# Patient Record
Sex: FEMALE | Race: OTHER | HISPANIC OR LATINO | URBAN - METROPOLITAN AREA
[De-identification: names, ages, dates, MRNs, and addresses within clinical notes are randomized per-mention and may not be internally consistent; named-entity substitution may affect disease eponyms.]

---

## 2018-07-11 ENCOUNTER — EMERGENCY (EMERGENCY)
Facility: HOSPITAL | Age: 43
LOS: 1 days | Discharge: HOME | End: 2018-07-11
Admitting: EMERGENCY MEDICINE
Payer: MEDICAID

## 2018-07-11 VITALS
TEMPERATURE: 99 F | SYSTOLIC BLOOD PRESSURE: 135 MMHG | HEART RATE: 73 BPM | DIASTOLIC BLOOD PRESSURE: 83 MMHG | RESPIRATION RATE: 18 BRPM | OXYGEN SATURATION: 100 %

## 2018-07-11 PROCEDURE — 99283 EMERGENCY DEPT VISIT LOW MDM: CPT

## 2018-07-11 RX ORDER — IBUPROFEN 200 MG
400 TABLET ORAL ONCE
Refills: 0 | Status: COMPLETED | OUTPATIENT
Start: 2018-07-11 | End: 2018-07-11

## 2018-07-11 RX ADMIN — Medication 400 MILLIGRAM(S): at 23:54

## 2018-07-11 NOTE — ED PROVIDER NOTE - OBJECTIVE STATEMENT
43 y/o female, no pmh, c/o foreign body sensation since waking up this AM. She states here eye was tearing and felt like something was in it but does not recal any trauma or foreign body. She does not wear contact or corrective lenses. Denies change in vision, photophobia. She was unable to see her eye doctor today.

## 2018-07-11 NOTE — ED ADULT TRIAGE NOTE - CHIEF COMPLAINT QUOTE
Patient c/o "feeling like something in my eye". Patient states her eyes are tearing a lot. Patient denies any changes in vision. Patient denies any PMHx.

## 2018-07-11 NOTE — ED PROVIDER NOTE - MEDICAL DECISION MAKING DETAILS
exam showed no fb or abrasion, no photophobia or d/c. pain relieved by tetracaine . lids fliped, no fb, eye irrigated. pt felt better. will d/c home with ophthalmology follow up

## 2018-07-11 NOTE — ED PROVIDER NOTE - LEFT EYE
mild tearing left eye, mild perilimbic injection otherwise conjunctiva clear. PERRL, slit lamp = no FB, No corneal abrasion. pain relieved with tetracaine

## 2018-09-12 ENCOUNTER — EMERGENCY (EMERGENCY)
Facility: HOSPITAL | Age: 43
LOS: 1 days | Discharge: HOME | End: 2018-09-12
Admitting: EMERGENCY MEDICINE
Payer: MEDICAID

## 2018-09-12 VITALS
SYSTOLIC BLOOD PRESSURE: 112 MMHG | TEMPERATURE: 98 F | OXYGEN SATURATION: 100 % | DIASTOLIC BLOOD PRESSURE: 75 MMHG | RESPIRATION RATE: 18 BRPM | HEART RATE: 64 BPM

## 2018-09-12 PROBLEM — Z00.00 ENCOUNTER FOR PREVENTIVE HEALTH EXAMINATION: Status: ACTIVE | Noted: 2018-09-12

## 2018-09-12 LAB
ALBUMIN SERPL ELPH-MCNC: 4.6 G/DL — SIGNIFICANT CHANGE UP (ref 3.3–5)
ALP SERPL-CCNC: 29 U/L — LOW (ref 40–120)
ALT FLD-CCNC: 15 U/L — SIGNIFICANT CHANGE UP (ref 4–33)
APPEARANCE UR: CLEAR — SIGNIFICANT CHANGE UP
AST SERPL-CCNC: 44 U/L — HIGH (ref 4–32)
BASOPHILS # BLD AUTO: 0.04 K/UL — SIGNIFICANT CHANGE UP (ref 0–0.2)
BASOPHILS NFR BLD AUTO: 0.5 % — SIGNIFICANT CHANGE UP (ref 0–2)
BILIRUB SERPL-MCNC: 0.2 MG/DL — SIGNIFICANT CHANGE UP (ref 0.2–1.2)
BILIRUB UR-MCNC: NEGATIVE — SIGNIFICANT CHANGE UP
BLOOD UR QL VISUAL: NEGATIVE — SIGNIFICANT CHANGE UP
BUN SERPL-MCNC: 11 MG/DL — SIGNIFICANT CHANGE UP (ref 7–23)
CALCIUM SERPL-MCNC: 9.5 MG/DL — SIGNIFICANT CHANGE UP (ref 8.4–10.5)
CHLORIDE SERPL-SCNC: 100 MMOL/L — SIGNIFICANT CHANGE UP (ref 98–107)
CO2 SERPL-SCNC: 25 MMOL/L — SIGNIFICANT CHANGE UP (ref 22–31)
COLOR SPEC: SIGNIFICANT CHANGE UP
CREAT SERPL-MCNC: 0.58 MG/DL — SIGNIFICANT CHANGE UP (ref 0.5–1.3)
EOSINOPHIL # BLD AUTO: 0.14 K/UL — SIGNIFICANT CHANGE UP (ref 0–0.5)
EOSINOPHIL NFR BLD AUTO: 1.8 % — SIGNIFICANT CHANGE UP (ref 0–6)
GLUCOSE SERPL-MCNC: 86 MG/DL — SIGNIFICANT CHANGE UP (ref 70–99)
GLUCOSE UR-MCNC: NEGATIVE — SIGNIFICANT CHANGE UP
HCT VFR BLD CALC: 34.8 % — SIGNIFICANT CHANGE UP (ref 34.5–45)
HGB BLD-MCNC: 11.7 G/DL — SIGNIFICANT CHANGE UP (ref 11.5–15.5)
IMM GRANULOCYTES # BLD AUTO: 0.02 # — SIGNIFICANT CHANGE UP
IMM GRANULOCYTES NFR BLD AUTO: 0.3 % — SIGNIFICANT CHANGE UP (ref 0–1.5)
KETONES UR-MCNC: NEGATIVE — SIGNIFICANT CHANGE UP
LEUKOCYTE ESTERASE UR-ACNC: NEGATIVE — SIGNIFICANT CHANGE UP
LYMPHOCYTES # BLD AUTO: 2.57 K/UL — SIGNIFICANT CHANGE UP (ref 1–3.3)
LYMPHOCYTES # BLD AUTO: 32.4 % — SIGNIFICANT CHANGE UP (ref 13–44)
MCHC RBC-ENTMCNC: 29.1 PG — SIGNIFICANT CHANGE UP (ref 27–34)
MCHC RBC-ENTMCNC: 33.6 % — SIGNIFICANT CHANGE UP (ref 32–36)
MCV RBC AUTO: 86.6 FL — SIGNIFICANT CHANGE UP (ref 80–100)
MONOCYTES # BLD AUTO: 0.53 K/UL — SIGNIFICANT CHANGE UP (ref 0–0.9)
MONOCYTES NFR BLD AUTO: 6.7 % — SIGNIFICANT CHANGE UP (ref 2–14)
NEUTROPHILS # BLD AUTO: 4.64 K/UL — SIGNIFICANT CHANGE UP (ref 1.8–7.4)
NEUTROPHILS NFR BLD AUTO: 58.3 % — SIGNIFICANT CHANGE UP (ref 43–77)
NITRITE UR-MCNC: NEGATIVE — SIGNIFICANT CHANGE UP
NRBC # FLD: 0 — SIGNIFICANT CHANGE UP
PH UR: 5.5 — SIGNIFICANT CHANGE UP (ref 5–8)
PLATELET # BLD AUTO: 403 K/UL — HIGH (ref 150–400)
PMV BLD: 10.3 FL — SIGNIFICANT CHANGE UP (ref 7–13)
POTASSIUM SERPL-MCNC: 5.3 MMOL/L — SIGNIFICANT CHANGE UP (ref 3.5–5.3)
POTASSIUM SERPL-SCNC: 5.3 MMOL/L — SIGNIFICANT CHANGE UP (ref 3.5–5.3)
PROT SERPL-MCNC: 7.8 G/DL — SIGNIFICANT CHANGE UP (ref 6–8.3)
PROT UR-MCNC: NEGATIVE — SIGNIFICANT CHANGE UP
RBC # BLD: 4.02 M/UL — SIGNIFICANT CHANGE UP (ref 3.8–5.2)
RBC # FLD: 13.3 % — SIGNIFICANT CHANGE UP (ref 10.3–14.5)
SODIUM SERPL-SCNC: 136 MMOL/L — SIGNIFICANT CHANGE UP (ref 135–145)
SP GR SPEC: 1.02 — SIGNIFICANT CHANGE UP (ref 1–1.04)
UROBILINOGEN FLD QL: NORMAL — SIGNIFICANT CHANGE UP
WBC # BLD: 7.94 K/UL — SIGNIFICANT CHANGE UP (ref 3.8–10.5)
WBC # FLD AUTO: 7.94 K/UL — SIGNIFICANT CHANGE UP (ref 3.8–10.5)

## 2018-09-12 PROCEDURE — 74176 CT ABD & PELVIS W/O CONTRAST: CPT | Mod: 26

## 2018-09-12 PROCEDURE — 99284 EMERGENCY DEPT VISIT MOD MDM: CPT

## 2018-09-12 RX ORDER — LIDOCAINE 4 G/100G
1 CREAM TOPICAL ONCE
Qty: 0 | Refills: 0 | Status: COMPLETED | OUTPATIENT
Start: 2018-09-12 | End: 2018-09-12

## 2018-09-12 RX ORDER — DIAZEPAM 5 MG
5 TABLET ORAL ONCE
Qty: 0 | Refills: 0 | Status: DISCONTINUED | OUTPATIENT
Start: 2018-09-12 | End: 2018-09-12

## 2018-09-12 RX ORDER — IBUPROFEN 200 MG
600 TABLET ORAL ONCE
Qty: 0 | Refills: 0 | Status: COMPLETED | OUTPATIENT
Start: 2018-09-12 | End: 2018-09-12

## 2018-09-12 RX ADMIN — Medication 5 MILLIGRAM(S): at 21:20

## 2018-09-12 RX ADMIN — Medication 600 MILLIGRAM(S): at 21:20

## 2018-09-12 RX ADMIN — LIDOCAINE 1 PATCH: 4 CREAM TOPICAL at 21:19

## 2018-09-12 RX ADMIN — Medication 600 MILLIGRAM(S): at 22:00

## 2018-09-12 NOTE — ED PROVIDER NOTE - OBJECTIVE STATEMENT
41 y/o female no pmh presents with c/o right lower back pain worse with movement and bending down x 3 days, denies any trauma, heavy lifting, headaches. neck pain, cough, f/c/n/v/d,c hest pain, sob, abdominal pain, groin pain, hematuria/polyuria, saddle anestehsia, loss of bowel/bladder, numbness/weakness/tingling, recent travel, sick contact, social history, has tried taking motrin with minimal relief

## 2018-09-12 NOTE — ED PROVIDER NOTE - PROGRESS NOTE DETAILS
JANICE SOLANO: ruq sono resulted, shows no acute roderick, pt states that shes feeling a lot better, + toelrating PO at this time, will have her f/u with surgery clini, and [facility]

## 2018-09-12 NOTE — ED PROVIDER NOTE - CARE PLAN
Principal Discharge DX:	Abdominal pain  Assessment and plan of treatment:	pls rest, drink plenty of fluids, tylenol/motrin as needed for pain, f/u in surgery clinic [phone], return for any worsening symptoms or any other concerning symptoms

## 2018-09-12 NOTE — ED ADULT TRIAGE NOTE - CHIEF COMPLAINT QUOTE
pt c/o right lower back and flank pain since sunday, denies injury or trauma. denies numbness/tingling down legs. denies urinary symptoms, n/v/d/fevers/chills or any additional symptoms. denies medical history.

## 2018-09-12 NOTE — ED PROVIDER NOTE - PLAN OF CARE
pls rest, drink plenty of fluids, tylenol/motrin as needed for pain, f/u in surgery clinic [phone], return for any worsening symptoms or any other concerning symptoms

## 2018-09-12 NOTE — ED PROVIDER NOTE - MEDICAL DECISION MAKING DETAILS
right lower back pain, + paraspinal tenderness, no midline tenderness + straigght leg, meds, reasses

## 2018-09-13 VITALS
SYSTOLIC BLOOD PRESSURE: 114 MMHG | OXYGEN SATURATION: 100 % | DIASTOLIC BLOOD PRESSURE: 60 MMHG | RESPIRATION RATE: 18 BRPM | TEMPERATURE: 98 F | HEART RATE: 66 BPM

## 2018-09-13 PROCEDURE — 76705 ECHO EXAM OF ABDOMEN: CPT | Mod: 26

## 2018-09-13 RX ORDER — LIDOCAINE 4 G/100G
1 CREAM TOPICAL
Qty: 7 | Refills: 0 | OUTPATIENT
Start: 2018-09-13 | End: 2018-09-19

## 2018-09-13 RX ORDER — LIDOCAINE 4 G/100G
1 CREAM TOPICAL
Qty: 5 | Refills: 0 | OUTPATIENT
Start: 2018-09-13 | End: 2018-09-15

## 2018-09-13 RX ORDER — CYCLOBENZAPRINE HYDROCHLORIDE 10 MG/1
10 TABLET, FILM COATED ORAL ONCE
Qty: 0 | Refills: 0 | Status: COMPLETED | OUTPATIENT
Start: 2018-09-13 | End: 2018-09-13

## 2018-09-13 RX ORDER — CYCLOBENZAPRINE HYDROCHLORIDE 10 MG/1
1 TABLET, FILM COATED ORAL
Qty: 15 | Refills: 0 | OUTPATIENT
Start: 2018-09-13 | End: 2018-09-17

## 2018-09-13 RX ADMIN — CYCLOBENZAPRINE HYDROCHLORIDE 10 MILLIGRAM(S): 10 TABLET, FILM COATED ORAL at 05:07

## 2018-09-13 NOTE — CONSULT NOTE ADULT - SUBJECTIVE AND OBJECTIVE BOX
SURGERY CONSULT NOTE  --------------------------------------------------------------------------------------------     HPI: 42 year old woman with PMH of kidney stones, , and umbilical hernia presents with right flank pain since . She states the pain has been worsening and now involves the RLQ. She has never had pain like this before. She states it feels like she pulled a muscle in her right lower back. The pain is less severe than the pain she experienced with kidney stones. She denies nausea, vomiting, fevers, chills, or dysuria.       PAST MEDICAL & SURGICAL HISTORY:  Kidney stones      ALLERGIES: latex (Unknown)  No Known Drug Allergies      --------------------------------------------------------------------------------------------    Vitals:   T(C): 36.7 (18 @ 23:54), Max: 36.7 (18 @ 23:54)  HR: 60 (18 @ 23:54) (60 - 64)  BP: 118/60 (18 @ 23:54) (112/75 - 118/60)  RR: 18 (18 @ 23:54) (18 - 18)  SpO2: 100% (18 @ 23:54) (100% - 100%)      PHYSICAL EXAM:  General: Alert, NAD  HEENT: NC/AT, no asymmetry, no scleral icterus  Cardio: RRR  Resp: Airway patent, unlabored breathing  GI/Abd: Soft, mild tenderness in RLQ, moderate RUQ and right flank and CVA tenderness, no guarding or rebound, no masses  Ext: Warm, no edema  --------------------------------------------------------------------------------------------    LABS  CBC ( 20:04)                              11.7                           7.94    )----------------(  403<H>     58.3  % Neutrophils, 32.4  % Lymphocytes, ANC: 4.64                                34.8      BMP ( 20:04)             136     |  100     |  11    		Ca++ --      Ca 9.5                ---------------------------------( 86    		Mg --                 5.3     |  25      |  0.58  			Ph --        LFTs ( 20:04)      TPro 7.8 / Alb 4.6 / TBili 0.2 / DBili -- / AST 44<H> / ALT 15 / AlkPhos 29<L>    --------------------------------------------------------------------------------------------    MICROBIOLOGY  Urinalysis ( @ 20:30):     Color: LIGHT YELLOW / Appearance: CLEAR / S.016 / pH: 5.5 / Gluc: NEGATIVE / Ketones: NEGATIVE / Bili: NEGATIVE / Urobili: NORMAL / Protein :NEGATIVE / Nitrites: NEGATIVE / Leuk.Est: NEGATIVE / RBC:  / WBC:  / Sq Epi:  / Non Sq Epi:  / Bacteria        --------------------------------------------------------------------------------------------    IMAGING    CT Abdomen and Pelvis No Cont (18 @ 23:35)  IMPRESSION:     Mild dilatation of right renal pelvis without radiopaque stone visualized   along the course of the ureter. Correlate with urinalysis as this may   represent recent interval passage of right kidney stone or urinary tract   infection.No intrarenal calculi. No left-sided hydronephrosis.     Subcentimeter hypodense lesions within inferior pole of bilateral   kidneys, too small to characterize. Consider nonemergent evaluation with   renal ultrasound for better characterization.     3 mm appendicolith within the appendix without adjacent fat infiltration   or edema. No secondary evidence of acute appendicitis.

## 2018-09-13 NOTE — ED POST DISCHARGE NOTE - RESULT SUMMARY
Pt. contacted ED; Rx for lidocaine gel (with quantity written as patch formulation) sent to pharmacy; pt requesting patch, not gel.  Pharmacy info verified with pt; Rx for lidocaine topical patches sent to pharmacy.  Pt. advised that if lidocaine 5% patches not covered by her insurance, that a 4% lidocaine patch is available OTC.  Pt. verbalized understanding.

## 2018-09-13 NOTE — CONSULT NOTE ADULT - ATTENDING COMMENTS
I have personally interviewed and examined this patient, reviewed pertinent labs and imaging, and discussed the case with colleagues, residents, and physician assistants on B Team rounds.       Plan  RUQ sono with polyps (<5mm), no secondary signs of cholecystitis or cholelithiasisis, pt should follow up for repeat usg in 1 year to ensure no growth  trial of po  if planning to discharge, pt should be instructed to return if worsening pain, fever, po intolerance or other concerning symptoms  The Acute Care Surgery (B Team) Attending Group Practice:  Dr. Jian Araujo, Dr. Kareen Ness, Dr. David Romano, Dr. Irma Hardin, Dr. John Barth    urgent issues - spectra [phone] or [phone]  nonurgent issues - [phone]  patient appointments or afterhours - [phone]

## 2018-09-13 NOTE — CONSULT NOTE ADULT - ASSESSMENT
42 year old woman with PMH of kidney stones, , and umbilical hernia presents with right flank and RLQ pain for 3 days. CT shows mild dilatation of the right renal pelvis and 3 mm appendicolith. No secondary signs of acute appendicitis.    - History, exam, and imaging do not point to a clear diagnosis at this time. Flank pain, dilated renal pelvis, and history of kidney stones suggest passed kidney stone. Patient also without nausea, vomiting, or fevers but with RLQ pain and appendicolith which raises question of appendicitis, though this is less likely.  - Given RUQ tenderness on exam, would check US to evaluate gallbladder.  - Please page B team surgery [phone] with any questions.  - Seen and examined with Dr. Hardin.    SARA Capps PGY 2 42 year old woman with PMH of kidney stones, , and umbilical hernia presents with right flank and RLQ pain for 3 days. CT shows mild dilatation of the right renal pelvis and 3 mm appendicolith. No secondary signs of acute appendicitis.    - History, exam, and imaging do not point to a clear diagnosis at this time. Flank pain, dilated renal pelvis, and history of kidney stones suggest passed kidney stone. Patient also without nausea, vomiting, or fevers  - Pt with some RUQ tenderness but gallbladder US shows no cholelithiasis or cholecystitis  - Recommend PO challenge  - Please page B team surgery [phone] with any questions  - Seen and examined with Dr. Wilfred Capps PGY 2

## 2018-09-17 ENCOUNTER — EMERGENCY (EMERGENCY)
Facility: HOSPITAL | Age: 43
LOS: 1 days | Discharge: HOME | End: 2018-09-17
Attending: EMERGENCY MEDICINE | Admitting: EMERGENCY MEDICINE
Payer: MEDICAID

## 2018-09-17 VITALS
SYSTOLIC BLOOD PRESSURE: 119 MMHG | OXYGEN SATURATION: 100 % | HEART RATE: 75 BPM | TEMPERATURE: 98 F | RESPIRATION RATE: 16 BRPM | DIASTOLIC BLOOD PRESSURE: 73 MMHG

## 2018-09-17 VITALS
HEART RATE: 70 BPM | RESPIRATION RATE: 18 BRPM | TEMPERATURE: 98 F | SYSTOLIC BLOOD PRESSURE: 94 MMHG | OXYGEN SATURATION: 100 % | DIASTOLIC BLOOD PRESSURE: 52 MMHG

## 2018-09-17 PROCEDURE — 99284 EMERGENCY DEPT VISIT MOD MDM: CPT

## 2018-09-17 PROCEDURE — 72131 CT LUMBAR SPINE W/O DYE: CPT | Mod: 26

## 2018-09-17 RX ORDER — IBUPROFEN 200 MG
600 TABLET ORAL ONCE
Qty: 0 | Refills: 0 | Status: COMPLETED | OUTPATIENT
Start: 2018-09-17 | End: 2018-09-17

## 2018-09-17 RX ORDER — OXYCODONE AND ACETAMINOPHEN 5; 325 MG/1; MG/1
1 TABLET ORAL ONCE
Qty: 0 | Refills: 0 | Status: DISCONTINUED | OUTPATIENT
Start: 2018-09-17 | End: 2018-09-17

## 2018-09-17 RX ORDER — LIDOCAINE 4 G/100G
1 CREAM TOPICAL ONCE
Qty: 0 | Refills: 0 | Status: COMPLETED | OUTPATIENT
Start: 2018-09-17 | End: 2018-09-17

## 2018-09-17 RX ORDER — DIAZEPAM 5 MG
5 TABLET ORAL ONCE
Qty: 0 | Refills: 0 | Status: DISCONTINUED | OUTPATIENT
Start: 2018-09-17 | End: 2018-09-17

## 2018-09-17 RX ADMIN — Medication 5 MILLIGRAM(S): at 15:19

## 2018-09-17 RX ADMIN — LIDOCAINE 1 PATCH: 4 CREAM TOPICAL at 15:20

## 2018-09-17 RX ADMIN — Medication 600 MILLIGRAM(S): at 15:19

## 2018-09-17 NOTE — ED PROVIDER NOTE - PROGRESS NOTE DETAILS
Garza: pt still c/o right flank pain.  requesting ct of lumbar- will order.  s/o to dr mckeon to re-assess. neurologically intact and no concern for stone or infection linda: pt signed out by dr suazo. pt pending ct performance and results. linda: pt ct neg for acute fracture, pain improved, stable for d/c. results provided

## 2018-09-17 NOTE — CHART NOTE - NSCHARTNOTEFT_GEN_A_CORE
Results of RUQ sonogram reviewed with patient and  - she has incidental gallbladder polyps < 5mm. I advised her to follow up with my office in 1 year for a repeat sonogram of the gallbladder to ensure the polyps had not enlarged. Business card with contact info given.

## 2018-09-17 NOTE — ED PROVIDER NOTE - OBJECTIVE STATEMENT
42 yr old female with hx of kidney stone presents to ed c/o persistent right flank pain x 1 wk.  Worse with movement and from lying to getting up.  pt was seen here in ed 9/12/18 and had kidney sono down and ct abd/pelvis- told might be a passed stone.  pt also mention that was out drinking at party prior to developing sx and might have falling causing the back pain.  no fever, no chills, no visual changes, no headache, no numbness or tingling, no sob, no cough, no cp, no palpitations, no leg swelling, no syncope, no abd pain, no n/v/d, no dysuria, no rashes, no vag bleed

## 2018-09-25 RX ORDER — METHOCARBAMOL 500 MG/1
1 TABLET, FILM COATED ORAL
Qty: 9 | Refills: 0 | OUTPATIENT
Start: 2018-09-25 | End: 2018-09-27

## 2020-12-22 ENCOUNTER — APPOINTMENT (OUTPATIENT)
Dept: NEUROLOGY | Facility: CLINIC | Age: 45
End: 2020-12-22

## 2021-10-25 ENCOUNTER — EMERGENCY (EMERGENCY)
Facility: HOSPITAL | Age: 46
LOS: 1 days | Discharge: HOME | End: 2021-10-25
Attending: EMERGENCY MEDICINE | Admitting: EMERGENCY MEDICINE
Payer: MEDICAID

## 2021-10-25 VITALS
DIASTOLIC BLOOD PRESSURE: 91 MMHG | TEMPERATURE: 99 F | HEART RATE: 80 BPM | SYSTOLIC BLOOD PRESSURE: 146 MMHG | RESPIRATION RATE: 18 BRPM | OXYGEN SATURATION: 100 %

## 2021-10-25 VITALS
RESPIRATION RATE: 18 BRPM | SYSTOLIC BLOOD PRESSURE: 156 MMHG | HEART RATE: 70 BPM | DIASTOLIC BLOOD PRESSURE: 87 MMHG | OXYGEN SATURATION: 100 % | TEMPERATURE: 98 F

## 2021-10-25 DIAGNOSIS — Z98.891 HISTORY OF UTERINE SCAR FROM PREVIOUS SURGERY: Chronic | ICD-10-CM

## 2021-10-25 LAB
ALBUMIN SERPL ELPH-MCNC: 5.1 G/DL — HIGH (ref 3.3–5)
ALP SERPL-CCNC: 53 U/L — SIGNIFICANT CHANGE UP (ref 40–120)
ALT FLD-CCNC: 13 U/L — SIGNIFICANT CHANGE UP (ref 4–33)
ANION GAP SERPL CALC-SCNC: 14 MMOL/L — SIGNIFICANT CHANGE UP (ref 7–14)
AST SERPL-CCNC: 19 U/L — SIGNIFICANT CHANGE UP (ref 4–32)
BASE EXCESS BLDV CALC-SCNC: 1.4 MMOL/L — SIGNIFICANT CHANGE UP (ref -2–3)
BASOPHILS # BLD AUTO: 0.07 K/UL — SIGNIFICANT CHANGE UP (ref 0–0.2)
BASOPHILS NFR BLD AUTO: 0.9 % — SIGNIFICANT CHANGE UP (ref 0–2)
BILIRUB SERPL-MCNC: 0.2 MG/DL — SIGNIFICANT CHANGE UP (ref 0.2–1.2)
BLOOD GAS VENOUS COMPREHENSIVE RESULT: SIGNIFICANT CHANGE UP
BUN SERPL-MCNC: 13 MG/DL — SIGNIFICANT CHANGE UP (ref 7–23)
CALCIUM SERPL-MCNC: 9.4 MG/DL — SIGNIFICANT CHANGE UP (ref 8.4–10.5)
CHLORIDE BLDV-SCNC: 105 MMOL/L — SIGNIFICANT CHANGE UP (ref 96–108)
CHLORIDE SERPL-SCNC: 103 MMOL/L — SIGNIFICANT CHANGE UP (ref 98–107)
CO2 BLDV-SCNC: 28.6 MMOL/L — HIGH (ref 22–26)
CO2 SERPL-SCNC: 24 MMOL/L — SIGNIFICANT CHANGE UP (ref 22–31)
CREAT SERPL-MCNC: 0.69 MG/DL — SIGNIFICANT CHANGE UP (ref 0.5–1.3)
EOSINOPHIL # BLD AUTO: 0.14 K/UL — SIGNIFICANT CHANGE UP (ref 0–0.5)
EOSINOPHIL NFR BLD AUTO: 1.8 % — SIGNIFICANT CHANGE UP (ref 0–6)
GAS PNL BLDV: 136 MMOL/L — SIGNIFICANT CHANGE UP (ref 136–145)
GAS PNL BLDV: SIGNIFICANT CHANGE UP
GLUCOSE BLDV-MCNC: 107 MG/DL — HIGH (ref 70–99)
GLUCOSE SERPL-MCNC: 101 MG/DL — HIGH (ref 70–99)
HCO3 BLDV-SCNC: 27 MMOL/L — SIGNIFICANT CHANGE UP (ref 22–29)
HCT VFR BLD CALC: 35.8 % — SIGNIFICANT CHANGE UP (ref 34.5–45)
HCT VFR BLDA CALC: 37 % — SIGNIFICANT CHANGE UP (ref 34.5–46.5)
HGB BLD CALC-MCNC: 12.4 G/DL — SIGNIFICANT CHANGE UP (ref 11.5–15.5)
HGB BLD-MCNC: 12.2 G/DL — SIGNIFICANT CHANGE UP (ref 11.5–15.5)
IANC: 3.67 K/UL — SIGNIFICANT CHANGE UP (ref 1.5–8.5)
IMM GRANULOCYTES NFR BLD AUTO: 0.1 % — SIGNIFICANT CHANGE UP (ref 0–1.5)
LACTATE BLDV-MCNC: 1 MMOL/L — SIGNIFICANT CHANGE UP (ref 0.5–2)
LYMPHOCYTES # BLD AUTO: 2.96 K/UL — SIGNIFICANT CHANGE UP (ref 1–3.3)
LYMPHOCYTES # BLD AUTO: 37.9 % — SIGNIFICANT CHANGE UP (ref 13–44)
MCHC RBC-ENTMCNC: 29.5 PG — SIGNIFICANT CHANGE UP (ref 27–34)
MCHC RBC-ENTMCNC: 34.1 GM/DL — SIGNIFICANT CHANGE UP (ref 32–36)
MCV RBC AUTO: 86.5 FL — SIGNIFICANT CHANGE UP (ref 80–100)
MONOCYTES # BLD AUTO: 0.97 K/UL — HIGH (ref 0–0.9)
MONOCYTES NFR BLD AUTO: 12.4 % — SIGNIFICANT CHANGE UP (ref 2–14)
NEUTROPHILS # BLD AUTO: 3.67 K/UL — SIGNIFICANT CHANGE UP (ref 1.8–7.4)
NEUTROPHILS NFR BLD AUTO: 46.9 % — SIGNIFICANT CHANGE UP (ref 43–77)
NRBC # BLD: 0 /100 WBCS — SIGNIFICANT CHANGE UP
NRBC # FLD: 0 K/UL — SIGNIFICANT CHANGE UP
PCO2 BLDV: 47 MMHG — HIGH (ref 39–42)
PH BLDV: 7.37 — SIGNIFICANT CHANGE UP (ref 7.32–7.43)
PLATELET # BLD AUTO: 362 K/UL — SIGNIFICANT CHANGE UP (ref 150–400)
PO2 BLDV: 45 MMHG — SIGNIFICANT CHANGE UP
POTASSIUM BLDV-SCNC: 3.4 MMOL/L — LOW (ref 3.5–5.1)
POTASSIUM SERPL-MCNC: 3.2 MMOL/L — LOW (ref 3.5–5.3)
POTASSIUM SERPL-SCNC: 3.2 MMOL/L — LOW (ref 3.5–5.3)
PROT SERPL-MCNC: 8.3 G/DL — SIGNIFICANT CHANGE UP (ref 6–8.3)
RAPID RVP RESULT: SIGNIFICANT CHANGE UP
RBC # BLD: 4.14 M/UL — SIGNIFICANT CHANGE UP (ref 3.8–5.2)
RBC # FLD: 13.3 % — SIGNIFICANT CHANGE UP (ref 10.3–14.5)
SAO2 % BLDV: 73.3 % — SIGNIFICANT CHANGE UP
SARS-COV-2 RNA SPEC QL NAA+PROBE: SIGNIFICANT CHANGE UP
SODIUM SERPL-SCNC: 141 MMOL/L — SIGNIFICANT CHANGE UP (ref 135–145)
WBC # BLD: 7.82 K/UL — SIGNIFICANT CHANGE UP (ref 3.8–10.5)
WBC # FLD AUTO: 7.82 K/UL — SIGNIFICANT CHANGE UP (ref 3.8–10.5)

## 2021-10-25 PROCEDURE — 99284 EMERGENCY DEPT VISIT MOD MDM: CPT

## 2021-10-25 RX ORDER — SODIUM CHLORIDE 9 MG/ML
1000 INJECTION INTRAMUSCULAR; INTRAVENOUS; SUBCUTANEOUS ONCE
Refills: 0 | Status: COMPLETED | OUTPATIENT
Start: 2021-10-25 | End: 2021-10-25

## 2021-10-25 RX ORDER — METOCLOPRAMIDE HCL 10 MG
10 TABLET ORAL ONCE
Refills: 0 | Status: COMPLETED | OUTPATIENT
Start: 2021-10-25 | End: 2021-10-25

## 2021-10-25 RX ORDER — DIPHENHYDRAMINE HCL 50 MG
25 CAPSULE ORAL ONCE
Refills: 0 | Status: COMPLETED | OUTPATIENT
Start: 2021-10-25 | End: 2021-10-25

## 2021-10-25 RX ADMIN — Medication 10 MILLIGRAM(S): at 22:03

## 2021-10-25 RX ADMIN — SODIUM CHLORIDE 2000 MILLILITER(S): 9 INJECTION INTRAMUSCULAR; INTRAVENOUS; SUBCUTANEOUS at 22:40

## 2021-10-25 RX ADMIN — Medication 25 MILLIGRAM(S): at 22:03

## 2021-10-25 NOTE — ED PROVIDER NOTE - NSFOLLOWUPINSTRUCTIONS_ED_ALL_ED_FT
You were seen in the emergency room for headache and neck pain. A CT of the blood vessels in your neck and head were normal. Your pain is likely related to the muscles and joints of your neck. Apply heating pad 5-10 minutes 3 times per day as needed for pain. Gentle neck stretching. Take Ibuprofen for pain as prescribed with food. Take muscle relaxer (cyclobenzaprine) as prescribed, this medication may make you drowsy.    You will be contacted by a hospital representative to help set up an appointment with a Neurology doctor during business hours. You may also use the patient access number above to set up your own appointment.    Seek medical attention if your headaches worsen, you have problems with your vision/speech or your arm/leg strength, or you have any concerns.

## 2021-10-25 NOTE — ED PROVIDER NOTE - OBJECTIVE STATEMENT
eunice: pt with father with hx brain aneurism with 1 year on and off ha, but last month more constant and debilitating.  stays on left side, pain is neck and posterior head, also consistent, no shifting to the right side.  pt with nausea, no vomiting.  denies fever. she has started feeling unbalanced when she walks  pmhx renal stones, c section  denies allergies or medications    denies N/V/abd pain/ fever/ chest pain/ SOB/ dysuria/ urgency/  extremity issue

## 2021-10-25 NOTE — ED PROVIDER NOTE - PHYSICAL EXAMINATION
pt alert and can phonate well, appears very uncomfortable  h at/nc  perrl, conj clear, sclera anicteric,  neck supple, mild lateral tenderness, also tenderness in traps, no bruit detected  cor rrr pos s1s2  lungs clear to asno wheeze  abd soft no r/g/t  ext no edema no deformities  neueo awake, lucid normal gait moves all extremities with strength  psych normal affect  vs bp elevated

## 2021-10-25 NOTE — ED PROVIDER NOTE - PATIENT PORTAL LINK FT
You can access the FollowMyHealth Patient Portal offered by [facility] by registering at the following website: [email or web address]. By joining [employer]’s FollowMyHealth portal, you will also be able to view your health information using other applications (apps) compatible with our system.

## 2021-10-25 NOTE — ED PROVIDER NOTE - PROGRESS NOTE DETAILS
Sign out follow-up: CTA negative for aneurysm. Pt pain is focal to left side paraspinal neck at C3-C4 region. Suspect tension headache 2/2 cervical muscle spasm vs cervical arthritis. JOSE G.

## 2021-10-25 NOTE — ED ADULT TRIAGE NOTE - CHIEF COMPLAINT QUOTE
Pt c/o headache x few weeks that is worse since yesterday, worse and throbbing on the left side with intermittent dizziness and intermittent left side hearing changes and nausea. Denies PMH

## 2021-10-25 NOTE — ED ADULT NURSE NOTE - OBJECTIVE STATEMENT
Pt received in intake room 2. Pt A&Ox4, PMHX Kidney stones, c/o a headache that has been intermittent for the last month, recently became worse. Pt states pain is on left side head and goes to her neck. Pt has nausea, no vomiting. Pt denies any chest pain, sob, abd pain, dizziness, n/v/d, fevers/chills at this time. Respirations even and unlabored, no accessory muscle use. 20G to R AC, labs sent. Stretcher in lowest position, side rails up, call bell within reach.

## 2021-10-25 NOTE — ED ADULT NURSE NOTE - NSIMPLEMENTINTERV_GEN_ALL_ED
Health Maintenance Summary     Topic Due On Due Status Completed On Postpone Until Reason    Pap Smear - Cervical Cancer Screening  Oct 6, 2016 Overdue       Immunization - TDAP Pregnancy  Hidden       IMMUNIZATION - DTaP/Tdap/Td Dec 19, 2023 Not Due Dec 19, 2013      Immunization-Influenza Sep 1, 2017 Postponed  Apr 1, 2018 Patient Refused    Depression Screening Jan 17, 2019 Not Due Jan 17, 2018            Patient is due for topics as listed above, she wishes to proceed at this time, order (s) placed and patient given information . No advanced directives on file. Pt received 5 wishes previously.           Implemented All Universal Safety Interventions:  [place] to call system. Call bell, personal items and telephone within reach. Instruct patient to call for assistance. Room bathroom lighting operational. Non-slip footwear when patient is off stretcher. Physically safe environment: no spills, clutter or unnecessary equipment. Stretcher in lowest position, wheels locked, appropriate side rails in place.

## 2021-10-25 NOTE — ED PROVIDER NOTE - CLINICAL SUMMARY MEDICAL DECISION MAKING FREE TEXT BOX
concerning HA, left sided, concern for aneurism vs carotid dissection, treating pain, ct's ordered, bp reasonable

## 2021-10-26 LAB
B PERT DNA SPEC QL NAA+PROBE: SIGNIFICANT CHANGE UP
B PERT+PARAPERT DNA PNL SPEC NAA+PROBE: SIGNIFICANT CHANGE UP
BORDETELLA PARAPERTUSSIS (RAPRVP): SIGNIFICANT CHANGE UP
C PNEUM DNA SPEC QL NAA+PROBE: SIGNIFICANT CHANGE UP
FLUAV SUBTYP SPEC NAA+PROBE: SIGNIFICANT CHANGE UP
FLUBV RNA SPEC QL NAA+PROBE: SIGNIFICANT CHANGE UP
HADV DNA SPEC QL NAA+PROBE: SIGNIFICANT CHANGE UP
HCOV 229E RNA SPEC QL NAA+PROBE: SIGNIFICANT CHANGE UP
HCOV HKU1 RNA SPEC QL NAA+PROBE: SIGNIFICANT CHANGE UP
HCOV NL63 RNA SPEC QL NAA+PROBE: SIGNIFICANT CHANGE UP
HCOV OC43 RNA SPEC QL NAA+PROBE: SIGNIFICANT CHANGE UP
HMPV RNA SPEC QL NAA+PROBE: SIGNIFICANT CHANGE UP
HPIV1 RNA SPEC QL NAA+PROBE: SIGNIFICANT CHANGE UP
HPIV2 RNA SPEC QL NAA+PROBE: SIGNIFICANT CHANGE UP
HPIV3 RNA SPEC QL NAA+PROBE: SIGNIFICANT CHANGE UP
HPIV4 RNA SPEC QL NAA+PROBE: SIGNIFICANT CHANGE UP
M PNEUMO DNA SPEC QL NAA+PROBE: SIGNIFICANT CHANGE UP
RSV RNA SPEC QL NAA+PROBE: SIGNIFICANT CHANGE UP
RV+EV RNA SPEC QL NAA+PROBE: SIGNIFICANT CHANGE UP

## 2021-10-26 PROCEDURE — 70498 CT ANGIOGRAPHY NECK: CPT | Mod: 26,MA

## 2021-10-26 PROCEDURE — 70496 CT ANGIOGRAPHY HEAD: CPT | Mod: 26,MA

## 2021-10-26 RX ORDER — IBUPROFEN 200 MG
1 TABLET ORAL
Qty: 21 | Refills: 0
Start: 2021-10-26 | End: 2021-11-01

## 2021-10-26 RX ORDER — POTASSIUM CHLORIDE 20 MEQ
40 PACKET (EA) ORAL ONCE
Refills: 0 | Status: COMPLETED | OUTPATIENT
Start: 2021-10-26 | End: 2021-10-26

## 2021-10-26 RX ORDER — KETOROLAC TROMETHAMINE 30 MG/ML
15 SYRINGE (ML) INJECTION ONCE
Refills: 0 | Status: DISCONTINUED | OUTPATIENT
Start: 2021-10-26 | End: 2021-10-26

## 2021-10-26 RX ORDER — CYCLOBENZAPRINE HYDROCHLORIDE 10 MG/1
1 TABLET, FILM COATED ORAL
Qty: 15 | Refills: 0
Start: 2021-10-26 | End: 2021-10-30

## 2021-10-26 RX ADMIN — Medication 15 MILLIGRAM(S): at 04:52

## 2021-10-26 RX ADMIN — Medication 40 MILLIEQUIVALENT(S): at 04:11

## 2021-12-28 ENCOUNTER — EMERGENCY (EMERGENCY)
Facility: HOSPITAL | Age: 46
LOS: 1 days | Discharge: HOME | End: 2021-12-28
Attending: STUDENT IN AN ORGANIZED HEALTH CARE EDUCATION/TRAINING PROGRAM | Admitting: STUDENT IN AN ORGANIZED HEALTH CARE EDUCATION/TRAINING PROGRAM
Payer: MEDICAID

## 2021-12-28 VITALS
HEART RATE: 66 BPM | DIASTOLIC BLOOD PRESSURE: 71 MMHG | OXYGEN SATURATION: 100 % | TEMPERATURE: 98 F | SYSTOLIC BLOOD PRESSURE: 118 MMHG | RESPIRATION RATE: 18 BRPM

## 2021-12-28 VITALS
TEMPERATURE: 99 F | SYSTOLIC BLOOD PRESSURE: 125 MMHG | DIASTOLIC BLOOD PRESSURE: 72 MMHG | HEART RATE: 70 BPM | RESPIRATION RATE: 19 BRPM

## 2021-12-28 DIAGNOSIS — Z98.891 HISTORY OF UTERINE SCAR FROM PREVIOUS SURGERY: Chronic | ICD-10-CM

## 2021-12-28 PROBLEM — N20.0 CALCULUS OF KIDNEY: Chronic | Status: ACTIVE | Noted: 2021-10-25

## 2021-12-28 LAB
APPEARANCE UR: CLEAR — SIGNIFICANT CHANGE UP
BACTERIA # UR AUTO: NEGATIVE — SIGNIFICANT CHANGE UP
BILIRUB UR-MCNC: NEGATIVE — SIGNIFICANT CHANGE UP
COLOR SPEC: SIGNIFICANT CHANGE UP
DIFF PNL FLD: NEGATIVE — SIGNIFICANT CHANGE UP
EPI CELLS # UR: 2 /HPF — SIGNIFICANT CHANGE UP (ref 0–5)
GLUCOSE UR QL: NEGATIVE — SIGNIFICANT CHANGE UP
HCG UR QL: NEGATIVE — SIGNIFICANT CHANGE UP
KETONES UR-MCNC: NEGATIVE — SIGNIFICANT CHANGE UP
LEUKOCYTE ESTERASE UR-ACNC: NEGATIVE — SIGNIFICANT CHANGE UP
NITRITE UR-MCNC: NEGATIVE — SIGNIFICANT CHANGE UP
PH UR: 6 — SIGNIFICANT CHANGE UP (ref 5–8)
PROT UR-MCNC: NEGATIVE — SIGNIFICANT CHANGE UP
RBC CASTS # UR COMP ASSIST: 1 /HPF — SIGNIFICANT CHANGE UP (ref 0–4)
SARS-COV-2 RNA SPEC QL NAA+PROBE: DETECTED
SP GR SPEC: 1.01 — SIGNIFICANT CHANGE UP (ref 1–1.05)
UROBILINOGEN FLD QL: SIGNIFICANT CHANGE UP
WBC UR QL: 1 /HPF — SIGNIFICANT CHANGE UP (ref 0–5)

## 2021-12-28 PROCEDURE — 99284 EMERGENCY DEPT VISIT MOD MDM: CPT | Mod: 25

## 2021-12-28 PROCEDURE — 93010 ELECTROCARDIOGRAM REPORT: CPT

## 2021-12-28 PROCEDURE — 71045 X-RAY EXAM CHEST 1 VIEW: CPT | Mod: 26

## 2021-12-28 RX ORDER — IBUPROFEN 200 MG
1 TABLET ORAL
Qty: 40 | Refills: 0
Start: 2021-12-28 | End: 2022-01-06

## 2021-12-28 RX ORDER — IBUPROFEN 200 MG
600 TABLET ORAL ONCE
Refills: 0 | Status: COMPLETED | OUTPATIENT
Start: 2021-12-28 | End: 2021-12-28

## 2021-12-28 RX ADMIN — Medication 600 MILLIGRAM(S): at 05:20

## 2021-12-28 NOTE — ED PROVIDER NOTE - PHYSICAL EXAMINATION
GENERAL: Patient awake alert NAD.  HEENT: NC/AT, Moist mucous membranes, no oropharyngeal erythema or exudates.  LUNGS: CTAB, no wheezes or crackles. Ambulatory sat 99%.  CARDIAC: RRR, no m/r/g.  ABDOMEN: Soft, NT, ND, No rebound, guarding.  EXT: No edema. No calf tenderness. CV 2+DP/PT bilaterally.  MSK: No pain with movement, no deformities.  NEURO: A&Ox3. Moving all extremities.  SKIN: Warm and dry. No rash.  PSYCH: Normal affect.

## 2021-12-28 NOTE — ED PROVIDER NOTE - PATIENT PORTAL LINK FT
You can access the FollowMyHealth Patient Portal offered by [facility] by registering at the following website: [email or web address]. By joining [employer]’s FollowMyHealth portal, you will also be able to view your health information using other applications (apps) compatible with our system.

## 2021-12-28 NOTE — ED ADULT TRIAGE NOTE - CHIEF COMPLAINT QUOTE
Pt c/o fever, HA, cough and body aches. Reports  also sick at home. Unvaccinated for covid. Denies pmhx/medications

## 2021-12-28 NOTE — ED PROVIDER NOTE - OBJECTIVE STATEMENT
46F w/ no significant PMHx p/w fever, myalgias, headache, sore throat since 12/25.  Unvaccinated for covid.    at home w/ similar sxs.  Denies n/v, able to tolerate PO.  Pt. has been taking 1000mg Tylenol q7 hours w/ relief but is here as sxs haven't resolved.  Endorses non-exertional pleuritic CP.  Denies any SOB.  Endorses urinary frequency w/o dysuria since yesterday.  LMP 12/21.

## 2021-12-28 NOTE — ED PROVIDER NOTE - CLINICAL SUMMARY MEDICAL DECISION MAKING FREE TEXT BOX
46F p/w fever, myalgias, headache, pleuritic CP, sore throat since 12/25.  Exam as above, VSS.  Will obtain EKG, CXR given CP.  PERC negative, no indication to emergently w/u PE.  Very low likelihood of ACS, but will eval for acute ischemic changes w/ EKG.  Will obtain UA w/ culture given urinary frequency.  Will reassess and dispo pending results.

## 2021-12-28 NOTE — ED PROVIDER NOTE - TOBACCO USE
New prescriptions sent to the pt's pharmacy.     Pt made aware that alternatives have been ordered due to insurance preference. Pt verbalizes understanding and denies any questions or concerns at this time.   Unknown if ever smoked

## 2021-12-28 NOTE — ED PROVIDER NOTE - ATTENDING CONTRIBUTION TO CARE
46F w h/o reported medical history p/w fevers, myalgias, headache, sore throat x 4-5 days. States she is not vaccinated against covid. States she has a sick contact at home w/ similar symptoms. Denies sob. Upon my evaluation patient denies ever having chest pain or sob despite history reported to staff and resident. States headache is gradual onset. Denies numbness, tingling, vision changes. States she does not usually get headaches. Denies recent immobilization, or h/o hormone use. Denies n/v/d, abdominal pain.    Except as documented in the HPI,  all other systems are negative    CONSTITUTIONAL: NAD, awake, alert  HEAD: Normocephalic; atraumatic  EYES: EOMI, no nystagmus, PERRLA  ENMT: External appears normal, MMM  NECK: no tenderness, FROM, no nuchal rigidity   CARD: Normal Sl, S2; no audible murmurs  RESP: normal wob, lungs ctab  ABD: soft, non-distended; non-tender  MSK: no edema, normal ROM in all four extremities  SKIN: Warm, dry, no rashes  NEURO: aaox3, moving all extremities spontaneously, 5/5 strength throughout, sensation grossly intact, normal gait, no drift     46F w no reported medical history presents w/ constellation of symptoms consistent w/ viral illness, vitals and ambulatory saturation reassuring, new headache, will ct r/o acute pathology, low suspicion for SAH given gradual onset headache, no meningeal signs, no focal neurological deficits, monitor for improvement of symptoms. Again upon multiple reiterations of questioning, patient denying ever having chest pain or sob upon my questioning, no tachycardia, no pedal edema

## 2021-12-28 NOTE — ED PROVIDER NOTE - PROGRESS NOTE DETAILS
Pieter PGY3 - W/u unremarkable.  Discussed results w/ pt., who understands them.  Will dc w/ PCP f/u, return, and isolation precautions.  All other questions and concerns have been addressed.

## 2021-12-28 NOTE — ED ADULT NURSE NOTE - OBJECTIVE STATEMENT
Received pt in intake room 1 with fever, Headache and body ache since December 25th. Patient alert and oriented x3 . Labs send.

## 2021-12-28 NOTE — ED PROVIDER NOTE - NSFOLLOWUPINSTRUCTIONS_ED_ALL_ED_FT
You were seen for fevers, body aches, headaches.  This is likely due to a viral illness.    Your work-up in the ED did not reveal anything acutely concerning.    Please call the Emergency Department in 24-48 hours to obtain your swab results.    Purchase a pulse oximeter if you don't have one already.  If your oxygen level is ever below 92%, seek immediate medical attention.    If you have any concerning symptoms, seek immediate medical attention.

## 2021-12-28 NOTE — ED ADULT TRIAGE NOTE - TEMPERATURE IN CELSIUS (DEGREES C)
Last refills of amlodipine & atorvastatin 5/17/17 #90 with 3 refills  Last visit 11/8/17, next scheduled 5/23/18  Script filled per protocol.   37.2

## 2021-12-29 LAB
CULTURE RESULTS: NO GROWTH — SIGNIFICANT CHANGE UP
SPECIMEN SOURCE: SIGNIFICANT CHANGE UP

## 2025-02-10 NOTE — ED PROVIDER NOTE - WR ORDER ID 1
Next visit at 18 months of age  Start to transition fully off the bottle  ????????? ????? ? ???????? 18 ???????.  ??????? ????????? ?????????? ?? ???????  Maria De Jesus vizit v vozraste 18 mesyatsev.  Jeanette hernandez'ami forte' ot [facility]            PATIENT INFORMATION    Anticipatory guidance discussed  Avoid infant walkers  Avoid potential choking hazards (large, spherical, or coin shaped foods)  Avoid small toys (choking hazard)  Car seat issues, including proper placement and transition to toddler seat at 20 pounds  Caution with possible poisons (pills, plants, cosmetics)  Child-proof home with cabinet locks, outlet plugs, window guards, and stair safety nicole  Discipline issues: limit-setting, positive reinforcement  Phase out bottle-feeding  Poison Control phone number 1-667.299.7517  Risk of child pulling down objects on him/herself  Smoke detectors    Follow-Up  - Return for your 18 month well child visit.    15 months old Health and Safety Tips - The following hyperlinks are available to access via [employer]    Parent Education from [employer]    Educación para padres sobre niños sanos    Common dosing for acetaminophen and ibuprofen:   Acetaminophen (Tylenol) can be given every 4 hours.  Infant or Children's Elixir: 160mg/5ml for  Weight 6-11 lbs 12-17 lbs 18-23 lbs 24-35 lbs   Dose 1.25 ml 2.5 ml 3.75 ml 5 ml      Weight 36-47 lbs 48-59 lbs 60-71 lsb 72-95 lbs   Dose 7.5 ml 10 ml 12.5 ml 15 ml     Ibuprofen (Motrin) can be given every 6 hours.  Safe for children 6 months and older.  Infant Drops: 50mg/1.25ml  Weight 12-17 lbs 18-23 lbs   Dose 1.25 ml 1.875 ml     Children's Elixir 100mg/5ml   Weight 12-17 lbs 18-23 lbs 24-35 lbs 36-47 lbs 48-59 lbs   Dose 2.5 ml 3.75 ml 5 ml 7.5 ml 10 ml        Weight 60-71 lbs 72-95 lbs   Dose 12.5 ml 15 ml     Additional Educational Resources:  For additional resources regarding your symptoms, diagnosis, or further health information, please visit the  Discover a Healthier You section on [email or web address] or the Online Health Resources section in [employer].     [email or web address]